# Patient Record
Sex: MALE | Race: WHITE | Employment: OTHER | URBAN - METROPOLITAN AREA
[De-identification: names, ages, dates, MRNs, and addresses within clinical notes are randomized per-mention and may not be internally consistent; named-entity substitution may affect disease eponyms.]

---

## 2023-09-20 ENCOUNTER — HOSPITAL ENCOUNTER (EMERGENCY)
Facility: CLINIC | Age: 68
Discharge: HOME | End: 2023-09-20
Attending: STUDENT IN AN ORGANIZED HEALTH CARE EDUCATION/TRAINING PROGRAM
Payer: MEDICARE

## 2023-09-20 ENCOUNTER — APPOINTMENT (OUTPATIENT)
Dept: GENERAL RADIOLOGY | Facility: CLINIC | Age: 68
End: 2023-09-20
Payer: MEDICARE

## 2023-09-20 VITALS
RESPIRATION RATE: 18 BRPM | OXYGEN SATURATION: 96 % | WEIGHT: 225 LBS | TEMPERATURE: 97.7 F | SYSTOLIC BLOOD PRESSURE: 132 MMHG | BODY MASS INDEX: 35.31 KG/M2 | HEIGHT: 67 IN | DIASTOLIC BLOOD PRESSURE: 94 MMHG | HEART RATE: 97 BPM

## 2023-09-20 DIAGNOSIS — M54.50 ACUTE RIGHT-SIDED LOW BACK PAIN WITHOUT SCIATICA: Primary | ICD-10-CM

## 2023-09-20 DIAGNOSIS — S22.080A COMPRESSION FRACTURE OF T11 VERTEBRA, INITIAL ENCOUNTER (HCC): ICD-10-CM

## 2023-09-20 PROCEDURE — 6370000000 HC RX 637 (ALT 250 FOR IP): Performed by: STUDENT IN AN ORGANIZED HEALTH CARE EDUCATION/TRAINING PROGRAM

## 2023-09-20 PROCEDURE — 72100 X-RAY EXAM L-S SPINE 2/3 VWS: CPT

## 2023-09-20 PROCEDURE — 99283 EMERGENCY DEPT VISIT LOW MDM: CPT

## 2023-09-20 RX ORDER — CYCLOBENZAPRINE HCL 5 MG
5 TABLET ORAL 2 TIMES DAILY PRN
Qty: 10 TABLET | Refills: 0 | Status: SHIPPED | OUTPATIENT
Start: 2023-09-20 | End: 2023-09-30

## 2023-09-20 RX ORDER — LIDOCAINE 50 MG/G
1 PATCH TOPICAL DAILY
Qty: 30 PATCH | Refills: 0 | Status: SHIPPED | OUTPATIENT
Start: 2023-09-20 | End: 2023-10-20

## 2023-09-20 RX ORDER — ACETAMINOPHEN 500 MG
1000 TABLET ORAL ONCE
Status: COMPLETED | OUTPATIENT
Start: 2023-09-20 | End: 2023-09-20

## 2023-09-20 RX ORDER — IBUPROFEN 200 MG
400 TABLET ORAL ONCE
Status: COMPLETED | OUTPATIENT
Start: 2023-09-20 | End: 2023-09-20

## 2023-09-20 RX ADMIN — ACETAMINOPHEN 1000 MG: 500 TABLET ORAL at 11:56

## 2023-09-20 RX ADMIN — IBUPROFEN 400 MG: 200 TABLET, FILM COATED ORAL at 11:56

## 2023-09-20 ASSESSMENT — PAIN SCALES - GENERAL: PAINLEVEL_OUTOF10: 10

## 2023-09-20 ASSESSMENT — LIFESTYLE VARIABLES
HOW OFTEN DO YOU HAVE A DRINK CONTAINING ALCOHOL: NEVER
HOW MANY STANDARD DRINKS CONTAINING ALCOHOL DO YOU HAVE ON A TYPICAL DAY: PATIENT DOES NOT DRINK

## 2023-09-20 ASSESSMENT — PAIN - FUNCTIONAL ASSESSMENT: PAIN_FUNCTIONAL_ASSESSMENT: 0-10

## 2023-09-20 NOTE — DISCHARGE INSTRUCTIONS
SUMMARY OF YOUR VISIT    Today you were seen for continued/new back pain. We discussed that your x-ray imaging did show a possible compression fracture although you preferred to have your primary care provider order the outpatient CT scans. Please call their office to discuss this with your primary care provider and have them order outpatient CT imaging of your thoracic and lumbar spine for further evaluation. I do recommend you follow-up with your primary care provider within 24 to 48 hours. I have also given you information follow-up with the neurosurgery team for further work up and evaluation if your primary care provider is not able to order this outpatient imaging. Please continue to take your home medication as previously prescribed, I have made no changes to your home medications. You can return to our or another Emergency Department as needed or for worsening symptoms of chest pain, shortness of breath, high fevers not relieved by acetaminophen (Tylenol) and/or ibuprofen (Motrin / Advil), chills, feeling of your heart fluttering or racing, persistent nausea and/or vomiting, vomiting up blood, blood in your stool, loss of consciousness, numbness, weakness or tingling in the arms or legs or change in color of the extremities, changes in mental status, persistent headache, blurry vision, loss of bladder / bowel control, if you are unable to follow up with your physician, or other any other care or concern. Thank You! On behalf of the Emergency Department staff and team, I would like to thank you for allowing us the opportunity to participate in your health care and evaluation today.

## 2023-09-20 NOTE — ED PROVIDER NOTES
although we do not have lidocaine patches at our facility unfortunately and patient drove himself here and therefore I cannot provide a muscle relaxer now. Although I do appreciate significant hypertonicity in the paraspinal region to the right lateral paraspinal musculature area where patient is localizing pain. Amount and/or Complexity of Data Reviewed  Radiology: ordered. Risk  OTC drugs. Prescription drug management. All EKG's are interpreted by the Emergency Department Physician who either signs or Co-signs this chart in the absence of a cardiologist.    EMERGENCY DEPARTMENT COURSE:        ED Course as of 09/23/23 0521   Wed Sep 20, 2023   1318 Patient requesting to leave multiple times. Multiple times with patient that we are awaiting x-ray imaging although he is free to leave he does not want to wait. Patient requesting to wait in his car. For patient that he is unable to wait in his car as that is considered elopement. Discussed with him that to rule out osseous involvement that we are awaiting x-ray results. Patient continues to asked to leave. Requiring me to come into the room multiple times. I have instructed patient to wait in the room for x-ray results multiple times. Patient continues to exit room and come into the hallway. Patient is ambulating with a steady gait. Does not appear to have any neurological dysfunction although we are awaiting imaging. [MA]      ED Course User Index  [MA] Merlin Rodríguez, DO     X-ray concerning for thoracic compression fracture. I did discuss this with patient. Also notable for cholelithiasis and nephrolithiasis which patient reports is known to him. Patient does not want to stay in the emergency department. Will provide acute pain prescription. Patient prefers to follow-up with primary care provider or neurosurgery outpatient. He reports that he will call his primary care provider to obtain outpatient imaging.   At this time patient is able to ambulate. Has no focal neurological deficits. Is able to control his bowel and bladder. This does not appear to be an acute injury as patient has reported multiple times no acute injury or falls. Patient discharged with diagnosis of low back pain and suspicious for compression fracture and will plan for outpatient work-up per patient's request.    Strict return precautions provided. Patient expresses understanding of discharge instructions and is able to repeat strict return precautions back to me. Patient discharged in stable condition after remained vitally stable throughout emergency department stay. PROCEDURES:  none    CONSULTS:  None    CRITICAL CARE:  There was significant risk of life threatening deterioration of patient's condition requiring my direct management. Critical care time 0 minutes, excluding any documented procedures. FINAL IMPRESSION      1. Acute right-sided low back pain without sciatica    2.  Compression fracture of T11 vertebra, initial encounter [facility])          DISPOSITION / PLAN     DISPOSITION Decision To Discharge 09/20/2023 02:03:04 PM      PATIENT REFERRED TO:  Baron Joe MD  [place] [address]     Call   Please call your Primary Care Provider to have him order an out patient thoracic and lumbar spine CT scan for further evaluation of your suspected compression fracture    [facility] ED  [facility]  [phone]    As needed, If symptoms worsen    [address] [facility] [address]  [phone]  Call   for post emergency department follow up for back pain with suspected thoracic fracture      DISCHARGE MEDICATIONS:  Discharge Medication List as of 9/20/2023  2:06 PM          Ernesto Rivera, DO  Emergency Medicine Physician    (Please note that portions of this note were completed with a voice recognition program.  Efforts were made to edit the dictations but occasionally words

## 2023-10-23 ENCOUNTER — OFFICE VISIT (OUTPATIENT)
Dept: NEUROSURGERY | Age: 68
End: 2023-10-23
Payer: MEDICARE

## 2023-10-23 VITALS
SYSTOLIC BLOOD PRESSURE: 142 MMHG | HEIGHT: 67 IN | HEART RATE: 95 BPM | BODY MASS INDEX: 35.31 KG/M2 | WEIGHT: 225 LBS | OXYGEN SATURATION: 96 % | DIASTOLIC BLOOD PRESSURE: 89 MMHG

## 2023-10-23 DIAGNOSIS — M47.27 OTHER SPONDYLOSIS WITH RADICULOPATHY, LUMBOSACRAL REGION: Primary | ICD-10-CM

## 2023-10-23 DIAGNOSIS — M51.9 LUMBOSACRAL DISC DISEASE: ICD-10-CM

## 2023-10-23 PROCEDURE — 4004F PT TOBACCO SCREEN RCVD TLK: CPT | Performed by: NURSE PRACTITIONER

## 2023-10-23 PROCEDURE — G8427 DOCREV CUR MEDS BY ELIG CLIN: HCPCS | Performed by: NURSE PRACTITIONER

## 2023-10-23 PROCEDURE — 1123F ACP DISCUSS/DSCN MKR DOCD: CPT | Performed by: NURSE PRACTITIONER

## 2023-10-23 PROCEDURE — G8417 CALC BMI ABV UP PARAM F/U: HCPCS | Performed by: NURSE PRACTITIONER

## 2023-10-23 PROCEDURE — 3017F COLORECTAL CA SCREEN DOC REV: CPT | Performed by: NURSE PRACTITIONER

## 2023-10-23 PROCEDURE — G8484 FLU IMMUNIZE NO ADMIN: HCPCS | Performed by: NURSE PRACTITIONER

## 2023-10-23 PROCEDURE — 99204 OFFICE O/P NEW MOD 45 MIN: CPT | Performed by: NURSE PRACTITIONER

## 2023-10-23 RX ORDER — ALPRAZOLAM 0.5 MG/1
TABLET ORAL
COMMUNITY
Start: 2023-07-25

## 2023-10-23 RX ORDER — TADALAFIL 10 MG/1
TABLET ORAL
COMMUNITY
Start: 2023-10-05

## 2023-10-23 RX ORDER — TESTOSTERONE GEL, 1% 10 MG/G
GEL TRANSDERMAL
COMMUNITY
Start: 2023-07-31

## 2023-10-23 RX ORDER — DAPAGLIFLOZIN 10 MG/1
10 TABLET, FILM COATED ORAL DAILY
COMMUNITY
Start: 2023-10-04

## 2023-10-23 RX ORDER — CELECOXIB 100 MG/1
CAPSULE ORAL
COMMUNITY
Start: 2023-10-05

## 2023-10-23 RX ORDER — DILTIAZEM HYDROCHLORIDE 60 MG/1
TABLET, FILM COATED ORAL
COMMUNITY
Start: 2023-09-08

## 2023-10-23 RX ORDER — TIRZEPATIDE 15 MG/.5ML
INJECTION, SOLUTION SUBCUTANEOUS
COMMUNITY
Start: 2023-10-20

## 2023-10-23 RX ORDER — INSULIN LISPRO 100 [IU]/ML
INJECTION, SUSPENSION SUBCUTANEOUS
COMMUNITY
Start: 2023-02-16

## 2023-10-23 RX ORDER — BUPROPION HYDROCHLORIDE 100 MG/1
TABLET, EXTENDED RELEASE ORAL
COMMUNITY
Start: 2023-08-23

## 2023-10-23 RX ORDER — ATORVASTATIN CALCIUM 20 MG/1
20 TABLET, FILM COATED ORAL DAILY
COMMUNITY
Start: 2023-07-25

## 2023-10-23 NOTE — PROGRESS NOTES
[address] [facility]  [address]  [address] [birth year] [address] [place] [address], [address] [place] [address] Road [address]  Dept: [phone]    Patient:  William Pedroza  YOB: 1955  Date: 10/23/23    The patient is a 76 y.o. male who presents today for consult of the following problems:     Chief Complaint   Patient presents with    Back Pain         HPI:     William Pedroza is a 76 y.o. male on whom neurosurgical consultation was requested by Baron Joe MD for management of back pain. Has had issues with back pain for the last 16-17 years. Some radiation down right leg at times. Caudal epidural lasted around 10 years. Pain returned a few years ago, had another round of injections with relief for 2-3 years. Pain to lower back and into right buttocks, and down posterior thigh. Interferes with sleep, walking. Denies any associated saddle anesthesia, changes to bowels or bladder. Has tried taking Celebrex, topical Voltaren gel, with limited benefit. Does have upcoming appointment tomorrow with pain specialist, Dr. Ho Blevins. Has completed some therapy in the past, also utilizes swimming to help keep symptoms managed. Is unable to tolerate public pools but utilize chlorine as it triggers significant nausea/stomach upset for the patient.     History:     Past Medical History:   Diagnosis Date    DDD (degenerative disc disease)     Diabetes mellitus (HCC)     DJD (degenerative joint disease)     Hypertension     Postlaminectomy syndrome, lumbar region 10/23/2013     Past Surgical History:   Procedure Laterality Date    ANKLE SURGERY      LITHOTRIPSY      NERVE BLOCK  10-24-13    celestone 9 mg,dura 1.5 mg    NERVE BLOCK  11/8/13    Caudal #2, Decadron 10mg     Family History   Problem Relation Age of Onset    Osteoporosis Mother 80    Diabetes Father 80    Kidney Disease Father 78     Current Outpatient Medications on File Prior to Visit

## 2023-11-09 ENCOUNTER — TELEPHONE (OUTPATIENT)
Dept: NEUROSURGERY | Age: 68
End: 2023-11-09

## 2023-11-09 DIAGNOSIS — M47.27 OTHER SPONDYLOSIS WITH RADICULOPATHY, LUMBOSACRAL REGION: Primary | ICD-10-CM

## 2023-11-09 DIAGNOSIS — M51.9 LUMBOSACRAL DISC DISEASE: ICD-10-CM

## 2023-11-09 NOTE — TELEPHONE ENCOUNTER
Patient states that he has fallen multiple time in the shower. He would like to know if you can order a shower chair for him.

## 2024-08-31 ENCOUNTER — APPOINTMENT (OUTPATIENT)
Dept: CT IMAGING | Age: 69
End: 2024-08-31
Payer: MEDICARE

## 2024-08-31 ENCOUNTER — HOSPITAL ENCOUNTER (EMERGENCY)
Age: 69
Discharge: HOME | End: 2024-08-31
Attending: EMERGENCY MEDICINE
Payer: MEDICARE

## 2024-08-31 VITALS
SYSTOLIC BLOOD PRESSURE: 124 MMHG | RESPIRATION RATE: 20 BRPM | TEMPERATURE: 97.7 F | BODY MASS INDEX: 36.81 KG/M2 | WEIGHT: 235 LBS | OXYGEN SATURATION: 96 % | HEART RATE: 97 BPM | DIASTOLIC BLOOD PRESSURE: 82 MMHG

## 2024-08-31 DIAGNOSIS — L02.214 ABSCESS OF GROIN, RIGHT: Primary | ICD-10-CM

## 2024-08-31 LAB
ALBUMIN SERPL-MCNC: 3.8 G/DL (ref 3.5–5.2)
ALP SERPL-CCNC: 80 U/L (ref 40–129)
ALT SERPL-CCNC: 13 U/L (ref 5–41)
ANION GAP SERPL CALCULATED.3IONS-SCNC: 10 MMOL/L (ref 9–17)
AST SERPL-CCNC: 14 U/L
BASOPHILS # BLD: 0.09 K/UL (ref 0–0.2)
BASOPHILS NFR BLD: 1 % (ref 0–2)
BILIRUB SERPL-MCNC: 0.7 MG/DL (ref 0.3–1.2)
BUN SERPL-MCNC: 23 MG/DL (ref 8–23)
BUN/CREAT SERPL: 23 (ref 9–20)
CALCIUM SERPL-MCNC: 9.4 MG/DL (ref 8.6–10.4)
CHLORIDE SERPL-SCNC: 104 MMOL/L (ref 98–107)
CO2 SERPL-SCNC: 23 MMOL/L (ref 20–31)
CREAT SERPL-MCNC: 1 MG/DL (ref 0.7–1.2)
EOSINOPHIL # BLD: 0.21 K/UL (ref 0–0.44)
EOSINOPHILS RELATIVE PERCENT: 2 % (ref 1–4)
ERYTHROCYTE [DISTWIDTH] IN BLOOD BY AUTOMATED COUNT: 15.3 % (ref 11.8–14.4)
GFR, ESTIMATED: 81 ML/MIN/1.73M2
GLUCOSE SERPL-MCNC: 123 MG/DL (ref 70–99)
HCT VFR BLD AUTO: 47.9 % (ref 40.7–50.3)
HGB BLD-MCNC: 15.6 G/DL (ref 13–17)
IMM GRANULOCYTES # BLD AUTO: 0.07 K/UL (ref 0–0.3)
IMM GRANULOCYTES NFR BLD: 1 %
LACTATE BLDV-SCNC: 0.9 MMOL/L (ref 0.5–1.9)
LACTATE BLDV-SCNC: 0.9 MMOL/L (ref 0.5–1.9)
LIPASE SERPL-CCNC: 57 U/L (ref 13–60)
LYMPHOCYTES NFR BLD: 1.46 K/UL (ref 1.1–3.7)
LYMPHOCYTES RELATIVE PERCENT: 15 % (ref 24–43)
MCH RBC QN AUTO: 31.9 PG (ref 25.2–33.5)
MCHC RBC AUTO-ENTMCNC: 32.6 G/DL (ref 28.4–34.8)
MCV RBC AUTO: 98 FL (ref 82.6–102.9)
MONOCYTES NFR BLD: 0.91 K/UL (ref 0.1–1.2)
MONOCYTES NFR BLD: 10 % (ref 3–12)
NEUTROPHILS NFR BLD: 71 % (ref 36–65)
NEUTS SEG NFR BLD: 6.74 K/UL (ref 1.5–8.1)
NRBC BLD-RTO: 0 PER 100 WBC
PLATELET # BLD AUTO: 142 K/UL (ref 138–453)
PMV BLD AUTO: 11.5 FL (ref 8.1–13.5)
POTASSIUM SERPL-SCNC: 4.7 MMOL/L (ref 3.7–5.3)
PROT SERPL-MCNC: 7.1 G/DL (ref 6.4–8.3)
RBC # BLD AUTO: 4.89 M/UL (ref 4.21–5.77)
RBC # BLD: ABNORMAL 10*6/UL
SODIUM SERPL-SCNC: 137 MMOL/L (ref 135–144)
WBC OTHER # BLD: 9.5 K/UL (ref 3.5–11.3)

## 2024-08-31 PROCEDURE — 10060 I&D ABSCESS SIMPLE/SINGLE: CPT

## 2024-08-31 PROCEDURE — 85025 COMPLETE CBC W/AUTO DIFF WBC: CPT

## 2024-08-31 PROCEDURE — 2580000003 HC RX 258: Performed by: EMERGENCY MEDICINE

## 2024-08-31 PROCEDURE — 2500000003 HC RX 250 WO HCPCS: Performed by: EMERGENCY MEDICINE

## 2024-08-31 PROCEDURE — 6360000004 HC RX CONTRAST MEDICATION: Performed by: EMERGENCY MEDICINE

## 2024-08-31 PROCEDURE — 80053 COMPREHEN METABOLIC PANEL: CPT

## 2024-08-31 PROCEDURE — 36415 COLL VENOUS BLD VENIPUNCTURE: CPT

## 2024-08-31 PROCEDURE — 74177 CT ABD & PELVIS W/CONTRAST: CPT

## 2024-08-31 PROCEDURE — 83605 ASSAY OF LACTIC ACID: CPT

## 2024-08-31 PROCEDURE — 99285 EMERGENCY DEPT VISIT HI MDM: CPT

## 2024-08-31 PROCEDURE — 83690 ASSAY OF LIPASE: CPT

## 2024-08-31 RX ORDER — CEPHALEXIN 500 MG/1
500 CAPSULE ORAL 4 TIMES DAILY
Qty: 28 CAPSULE | Refills: 0 | Status: SHIPPED | OUTPATIENT
Start: 2024-08-31 | End: 2024-09-07

## 2024-08-31 RX ORDER — SODIUM CHLORIDE 0.9 % (FLUSH) 0.9 %
10 SYRINGE (ML) INJECTION PRN
Status: DISCONTINUED | OUTPATIENT
Start: 2024-08-31 | End: 2024-08-31 | Stop reason: HOSPADM

## 2024-08-31 RX ORDER — 0.9 % SODIUM CHLORIDE 0.9 %
1000 INTRAVENOUS SOLUTION INTRAVENOUS ONCE
Status: COMPLETED | OUTPATIENT
Start: 2024-08-31 | End: 2024-08-31

## 2024-08-31 RX ORDER — LIDOCAINE HYDROCHLORIDE AND EPINEPHRINE 10; 10 MG/ML; UG/ML
20 INJECTION, SOLUTION INFILTRATION; PERINEURAL ONCE
Status: COMPLETED | OUTPATIENT
Start: 2024-08-31 | End: 2024-08-31

## 2024-08-31 RX ORDER — IOPAMIDOL 755 MG/ML
75 INJECTION, SOLUTION INTRAVASCULAR
Status: COMPLETED | OUTPATIENT
Start: 2024-08-31 | End: 2024-08-31

## 2024-08-31 RX ORDER — 0.9 % SODIUM CHLORIDE 0.9 %
100 INTRAVENOUS SOLUTION INTRAVENOUS ONCE
Status: COMPLETED | OUTPATIENT
Start: 2024-08-31 | End: 2024-08-31

## 2024-08-31 RX ADMIN — LIDOCAINE HYDROCHLORIDE,EPINEPHRINE BITARTRATE 20 ML: 10; .01 INJECTION, SOLUTION INFILTRATION; PERINEURAL at 15:26

## 2024-08-31 RX ADMIN — IOPAMIDOL 75 ML: 755 INJECTION, SOLUTION INTRAVENOUS at 12:30

## 2024-08-31 RX ADMIN — SODIUM CHLORIDE, PRESERVATIVE FREE 10 ML: 5 INJECTION INTRAVENOUS at 12:30

## 2024-08-31 RX ADMIN — SODIUM CHLORIDE 100 ML: 9 INJECTION, SOLUTION INTRAVENOUS at 12:31

## 2024-08-31 RX ADMIN — SODIUM CHLORIDE 1000 ML: 9 INJECTION, SOLUTION INTRAVENOUS at 11:34

## 2024-08-31 ASSESSMENT — PAIN SCALES - GENERAL: PAINLEVEL_OUTOF10: 8

## 2024-08-31 ASSESSMENT — PAIN - FUNCTIONAL ASSESSMENT: PAIN_FUNCTIONAL_ASSESSMENT: 0-10

## 2024-08-31 ASSESSMENT — PAIN DESCRIPTION - LOCATION: LOCATION: BUTTOCKS;LEG

## 2024-08-31 ASSESSMENT — PAIN DESCRIPTION - ORIENTATION: ORIENTATION: RIGHT

## 2024-08-31 NOTE — DISCHARGE INSTRUCTIONS
Leave packing in for 48 hours if possible, this will allow abscess to drain fluid.    Return to this emergency room immediately if your symptoms persist, worsen or if new ones form.    Make sure you follow-up with your primary care doctor within the next 1-2 business days.

## 2024-08-31 NOTE — ED NOTES
Pt presents to ER from home due to Leg pain.Pt states abscess located to his Thigh/Groin area.Pt denies Fever or chills. Pt is A/O x 4, equal chest expansion with nonlabored breathing, wheels locked, bed in lowest position, call light in reach.

## 2024-08-31 NOTE — ED PROVIDER NOTES
EMERGENCY DEPARTMENT ENCOUNTER    Pt Name: Aislinn Bills  MRN: [id number]  Birthdate 1955  Date of evaluation: 8/31/24  CHIEF COMPLAINT       Chief Complaint   Patient presents with    Leg Pain     Pt reports right thigh/groin pain. Reports abscess/pain starting 2 days ago.      HISTORY OF PRESENT ILLNESS   The history is provided by the patient and medical records.    The patient is a 69-year-old male with history of degenerative disc disease, status post postlaminectomy, insulin-dependent diabetes, hypertension, insomnia, kidney stoneswho presents to the ED for tender lump in right groin area.  Symptom started 2 days ago. No reports of hematemesis.  No reports of melena or bright red blood in stool.    REVIEW OF SYSTEMS     Review of Systems  All other systems reviewed and are negative.    PASTMEDICAL HISTORY     Past Medical History:   Diagnosis Date    DDD (degenerative disc disease)     Diabetes mellitus (HCC)     type 2 Dr. Lauren/ jono/ last seen     DJD (degenerative joint disease)     Hypertension     Insomnia     Kidney stone     Postlaminectomy syndrome, lumbar region 10/23/2013    Under care of team     Urology Dr. Velasco / jono/ last seen     Wears glasses     Wellness examination     PCP Nell Padilla MD/ jono/ last seen      Past Problem List  Patient Active Problem List   Diagnosis Code    Postlaminectomy syndrome, lumbar region M96.1     SURGICAL HISTORY       Past Surgical History:   Procedure Laterality Date    ANKLE SURGERY Left     LITHOTRIPSY      NERVE BLOCK  10/24/2013    celestone 9 mg,dura 1.5 mg    NERVE BLOCK  11/08/2013    Caudal #2, Decadron 10mg     CURRENT MEDICATIONS       Previous Medications    ALFUZOSIN (UROXATRAL) 10 MG EXTENDED RELEASE TABLET    Take 1 tablet by mouth 2 times daily    ALPRAZOLAM (XANAX) 0.5 MG TABLET    TAKE 1 TABLET BY MOUTH ONCE DAILY AS NEEDED    ASPIRIN 81 MG TABLET    Take 1 tablet by mouth daily Pt. stopped 10 days ago for OR  components within normal limits   COMPREHENSIVE METABOLIC PANEL - Abnormal; Notable for the following components:    Glucose 123 (*)     BUN/Creatinine Ratio 23 (*)     All other components within normal limits   LIPASE   LACTATE, SEPSIS   LACTATE, SEPSIS       Vitals Reviewed:    Vitals:    08/31/24 1038   BP: 124/82   Pulse: 97   Resp: 20   Temp: 97.7 °F (36.5 °C)   TempSrc: Oral   SpO2: 96%   Weight: 106.6 kg (235 lb)     MEDICATIONS GIVEN TO PATIENT THIS ENCOUNTER:  Orders Placed This Encounter   Medications    sodium chloride 0.9 % bolus 1,000 mL    sodium chloride 0.9 % bolus 100 mL    iopamidol (ISOVUE-370) 76 % injection 75 mL    sodium chloride flush 0.9 % injection 10 mL    lidocaine-EPINEPHrine 1 %-1:727752 injection 20 mL    cephALEXin (KEFLEX) 500 MG capsule     Sig: Take 1 capsule by mouth 4 times daily for 7 days     Dispense:  28 capsule     Refill:  0     DISCHARGE PRESCRIPTIONS:  New Prescriptions    CEPHALEXIN (KEFLEX) 500 MG CAPSULE    Take 1 capsule by mouth 4 times daily for 7 days     PHYSICIAN CONSULTS ORDERED THIS ENCOUNTER:  None  FINAL IMPRESSION      1. Abscess of groin, right          DISPOSITION/PLAN   DISPOSITION Decision To Discharge 08/31/2024 03:08:43 PM  Condition at Disposition: Good      OUTPATIENT FOLLOW UP THE PATIENT:  Nell Padilla MD  [address] [place] [address]  [phone]    In 2 days        MD Heather Ramirez Joseph R, MD  08/31/24 2273

## 2025-03-04 ENCOUNTER — HOSPITAL ENCOUNTER (OUTPATIENT)
Dept: PHYSICAL THERAPY | Facility: CLINIC | Age: 70
Setting detail: THERAPIES SERIES
Discharge: HOME | End: 2025-03-04
Payer: MEDICARE

## 2025-03-04 PROCEDURE — 97110 THERAPEUTIC EXERCISES: CPT

## 2025-03-04 PROCEDURE — 97161 PT EVAL LOW COMPLEX 20 MIN: CPT

## 2025-03-04 NOTE — CONSULTS
Comments         NuStep             Calf Stretch  2x30\"  seated   Hamstring Stretch  2x30\"  seated         LTR      Posterior pelvic tilts      Posterior pelvic tilts with march       Hip Flexor Stretch  1'     Piriformis Stretch   2x30\"           Clamshells      Sidelying hip abd       Bridges  x5           4-way hip       Total Gym Squats             Other:    Specific Instructions for next treatment: general mobility , core stabilization       Evaluation Complexity:  History (Personal factors, comorbidities) [] 0 [x] 1-2 [] 3+   Exam (limitations, restrictions) [] 1-2 [] 3 [x] 4+   Clinical presentation (progression) [x] Stable [] Evolving  [] Unstable   Decision Making [x] Low [] Moderate [] High    [x] Low Complexity [] Moderate Complexity [] High Complexity       Treatment Charges: Mins Units   [x] Evaluation       [x]  Low       []  Moderate       []  High 28 1   []  Modalities     [x]  Ther Exercise 10 1   []  Manual Therapy     []  Ther Activities     []  Aquatics     []  Vasocompression     []  Other       TOTAL TREATMENT TIME: 38 min     Time in: 6:12p      Time out: 6:50p    Electronically signed by: Floresita Wallace PT    Physician Signature:________________________________Date:__________________  By signing above or cosigning this note, I have reviewed this plan of care and certify a need for medically necessary rehabilitation services.     *PLEASE SIGN ABOVE AND FAX BACK ALL PAGES*

## 2025-03-12 ENCOUNTER — HOSPITAL ENCOUNTER (OUTPATIENT)
Dept: PHYSICAL THERAPY | Facility: CLINIC | Age: 70
Setting detail: THERAPIES SERIES
Discharge: HOME | End: 2025-03-12
Payer: MEDICARE

## 2025-03-12 PROCEDURE — 97110 THERAPEUTIC EXERCISES: CPT

## 2025-03-12 NOTE — FLOWSHEET NOTE
[] [facility]  Outpatient Rehabilitation &  Therapy  [address]  [phone]  [phone] [] [facility]  Outpatient Rehabilitation &  Therapy  [address]  P: [phone]  F: [phone] [] [facility]  Outpatient Rehabilitation &  Therapy  [address]  P: [phone]  F: [phone] [] [facility]  Outpatient Rehabilitation &  Therapy  [address]  [phone]  [phone] [x] [facility]  Outpatient Rehabilitation &  Therapy  [address]   P: [phone]  F: [phone]  [] [facility]  Outpatient Rehabilitation &  Therapy  [address]  P: [phone]  F: [phone] [] [facility]  Outpatient Rehabilitation &  Therapy  [address]  P: [phone]  F: [phone] [] [facility]  Outpatient Rehabilitation &  Therapy  [address]  P: [phone]  F: [phone] [] [facility]  Outpatient Rehabilitation &  Therapy  [address]  P: [phone]  F: [phone]  [] [facility] Outpatient Rehabilitation &  Therapy  [address]  P: [phone]  F: [phone]     Physical Therapy Daily Treatment Note    Date:  3/12/2025  Patient Name:  Aislinn Bills    :  1955  MRN: [id number]  Physician: Dr. Randy Camejo   Insurance: BCBS Medicare ([place] Medicaid, Auth# [id number] 25-25 [id number])  Medical Diagnosis: M54.50 (ICD-10-CM) - Low back pain   Rehab Codes: M62.81 , R26.89 , M54.5 , M25.651, M25.652   Onset Date: referral date 25                            Next 's appt.: --  Visit# / total visits: 2/      Cancels/No Shows: 0    Subjective:    Pain:  [x] Yes  [] No Location:  low back   Pain Rating: (0-10 scale) not rated/10  Pain altered Tx:  [x] No  [] Yes  Action:  Comments: patient

## 2025-03-17 ENCOUNTER — HOSPITAL ENCOUNTER (OUTPATIENT)
Dept: PHYSICAL THERAPY | Facility: CLINIC | Age: 70
Setting detail: THERAPIES SERIES
Discharge: HOME | End: 2025-03-17
Payer: MEDICARE

## 2025-03-17 PROCEDURE — 97110 THERAPEUTIC EXERCISES: CPT

## 2025-03-17 NOTE — FLOWSHEET NOTE
Verbalizes understanding.  [x] Demonstrates understanding.  [x] Needs review.  [] Demonstrates/verbalizes HEP/Ed previously given.     Plan: [x] Continue current frequency toward long and short term goals.    [x] Specific Instructions for subsequent treatments: see above       Time In:6:00p            Time Out: 6:50p      Electronically signed by:  Floresita Wallace PT

## 2025-03-19 ENCOUNTER — HOSPITAL ENCOUNTER (OUTPATIENT)
Dept: PHYSICAL THERAPY | Facility: CLINIC | Age: 70
Setting detail: THERAPIES SERIES
Discharge: HOME | End: 2025-03-19
Payer: MEDICARE

## 2025-03-19 NOTE — FLOWSHEET NOTE
[] [facility]  Outpatient Rehabilitation &  Therapy  [address]  [phone]  [phone] [] [facility]  Outpatient Rehabilitation &  Therapy  [address]  P: [phone]  F: [phone] [] [facility]  Outpatient Rehabilitation &  Therapy  [address]  P: [phone]  F: [phone] [] [facility]  Outpatient Rehabilitation &  Therapy  [address]  [phone]  [phone] [x] [facility]  Outpatient Rehabilitation &  Therapy  [address]   P: [phone]  F: [phone]  [] [facility]  Outpatient Rehabilitation &  Therapy  [address]  P: [phone]  F: [phone] [] [facility]  Outpatient Rehabilitation &  Therapy  [address]  P: [phone]  F: [phone] [] [facility]  Outpatient Rehabilitation &  Therapy  [address]  P: [phone]  F: [phone] [] [facility]  Outpatient Rehabilitation &  Therapy  [address]  P: [phone]  F: [phone]  [] [facility] Outpatient Rehabilitation &  Therapy  [address]  P: [phone]  F: [phone]     Therapy Cancel/No Show note    Date: 3/19/2025  Patient: Aislinn Bills  : 1955  MRN: [id number]    Cancels/No Shows to date:     For today's appointment patient:    [x]  Cancelled    [] Rescheduled appointment    [] No-show     Reason given by patient:    []  Patient ill    []  Conflicting appointment    [] No transportation      [] Conflict with work    [x] No reason given    [] Weather related    [] COVID-19    [] Other:      Comments:        [x] Next appointment was confirmed    Electronically signed by: Eliana Bryant

## 2025-03-24 ENCOUNTER — APPOINTMENT (OUTPATIENT)
Dept: PHYSICAL THERAPY | Facility: CLINIC | Age: 70
End: 2025-03-24
Payer: MEDICARE

## 2025-03-26 ENCOUNTER — HOSPITAL ENCOUNTER (OUTPATIENT)
Dept: PHYSICAL THERAPY | Facility: CLINIC | Age: 70
Setting detail: THERAPIES SERIES
Discharge: HOME | End: 2025-03-26
Payer: MEDICARE

## 2025-03-26 ENCOUNTER — APPOINTMENT (OUTPATIENT)
Dept: PHYSICAL THERAPY | Facility: CLINIC | Age: 70
End: 2025-03-26
Payer: MEDICARE

## 2025-03-26 PROCEDURE — 97110 THERAPEUTIC EXERCISES: CPT

## 2025-03-26 NOTE — DISCHARGE SUMMARY
[] [facility]  Outpatient Rehabilitation &  Therapy  [address]  [phone]  [phone] [] [facility]  Outpatient Rehabilitation &  Therapy  [address]  P: [phone]  F: [phone] [] [facility]  Outpatient Rehabilitation &  Therapy  [address]  P: [phone]  F: [phone] [] [facility]  Outpatient Rehabilitation &  Therapy  [address]  [phone]  [phone] [x] [facility]  Outpatient Rehabilitation &  Therapy  [address]   P: [phone]  F: [phone]  [] [facility]  Outpatient Rehabilitation &  Therapy  [address]  P: [phone]  F: [phone] [] [facility]  Outpatient Rehabilitation &  Therapy  [address]  P: [phone]  F: [phone] [] [facility]  Outpatient Rehabilitation &  Therapy  [address]  P: [phone]  F: [phone] [] [facility]  Outpatient Rehabilitation &  Therapy  [address]  P: [phone]  F: [phone]  [] [facility] Outpatient Rehabilitation &  Therapy  [address]  P: [phone]  F: [phone]     Physical Therapy Daily Treatment and Discharge Note    Date:  3/26/2025  Patient Name:  Aislinn Bills    :  1955  MRN: [id number]  Physician: Dr. Randy Camejo   Insurance: BCBS Medicare ([place] Medicaid, Auth# [id number])  Medical Diagnosis: M54.50 (ICD-10-CM) - Low back pain   Rehab Codes: M62.81 , R26.89 , M54.5 , M25.651, M25.652   Onset Date: referral date 25                            Next 's appt.: --  Visit# / total visits:       Cancels/No Shows: 0    Subjective:    Pain:  [x] Yes  [] No Location:  low back   Pain Rating: (0-10 scale) not rated/10  Pain altered Tx:  [x] No  [] Yes

## 2025-03-31 ENCOUNTER — APPOINTMENT (OUTPATIENT)
Dept: PHYSICAL THERAPY | Facility: CLINIC | Age: 70
End: 2025-03-31
Payer: MEDICARE

## 2025-08-08 LAB — PROSTATE SPECIFIC ANTIGEN: 1.12 NG/ML
